# Patient Record
Sex: MALE | Race: WHITE | Employment: FULL TIME | URBAN - METROPOLITAN AREA
[De-identification: names, ages, dates, MRNs, and addresses within clinical notes are randomized per-mention and may not be internally consistent; named-entity substitution may affect disease eponyms.]

---
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
## 2024-03-26 ENCOUNTER — APPOINTMENT (OUTPATIENT)
Dept: GENERAL RADIOLOGY | Age: 24
End: 2024-03-26

## 2024-03-26 ENCOUNTER — HOSPITAL ENCOUNTER (EMERGENCY)
Age: 24
Discharge: HOME | End: 2024-03-26
Attending: EMERGENCY MEDICINE

## 2024-03-26 VITALS
HEIGHT: 73 IN | TEMPERATURE: 97.3 F | BODY MASS INDEX: 34.51 KG/M2 | WEIGHT: 260.36 LBS | RESPIRATION RATE: 20 BRPM | SYSTOLIC BLOOD PRESSURE: 147 MMHG | DIASTOLIC BLOOD PRESSURE: 87 MMHG | HEART RATE: 88 BPM | OXYGEN SATURATION: 100 %

## 2024-03-26 DIAGNOSIS — F41.1 ANXIETY STATE: ICD-10-CM

## 2024-03-26 DIAGNOSIS — R07.9 CHEST PAIN, UNSPECIFIED TYPE: Primary | ICD-10-CM

## 2024-03-26 LAB
ALBUMIN SERPL-MCNC: 4.8 G/DL (ref 3.5–5.2)
ALBUMIN/GLOB SERPL: 2 {RATIO} (ref 1–2.5)
ALP SERPL-CCNC: 63 U/L (ref 40–129)
ALT SERPL-CCNC: 24 U/L (ref 10–50)
ANION GAP SERPL CALCULATED.3IONS-SCNC: 15 MMOL/L (ref 9–16)
AST SERPL-CCNC: 32 U/L (ref 10–50)
BACTERIA URNS QL MICRO: ABNORMAL
BASOPHILS # BLD: 0.07 K/UL (ref 0–0.2)
BASOPHILS NFR BLD: 1 % (ref 0–2)
BILIRUB SERPL-MCNC: 1.4 MG/DL (ref 0–1.2)
BILIRUB UR QL STRIP: ABNORMAL
BUN SERPL-MCNC: 8 MG/DL (ref 6–20)
CALCIUM SERPL-MCNC: 9.7 MG/DL (ref 8.6–10.4)
CASTS #/AREA URNS LPF: ABNORMAL /LPF (ref 0–8)
CHLORIDE SERPL-SCNC: 102 MMOL/L (ref 98–107)
CLARITY UR: ABNORMAL
CO2 SERPL-SCNC: 21 MMOL/L (ref 20–31)
COLOR UR: ABNORMAL
CREAT SERPL-MCNC: 0.8 MG/DL (ref 0.7–1.2)
EOSINOPHIL # BLD: 0.04 K/UL (ref 0–0.44)
EOSINOPHILS RELATIVE PERCENT: 0 % (ref 1–4)
EPI CELLS #/AREA URNS HPF: ABNORMAL /HPF (ref 0–5)
ERYTHROCYTE [DISTWIDTH] IN BLOOD BY AUTOMATED COUNT: 12.9 % (ref 11.8–14.4)
GFR SERPL CREATININE-BSD FRML MDRD: >90 ML/MIN/1.73M2
GLUCOSE SERPL-MCNC: 100 MG/DL (ref 74–99)
GLUCOSE UR STRIP-MCNC: NEGATIVE MG/DL
HCT VFR BLD AUTO: 47.4 % (ref 40.7–50.3)
HGB BLD-MCNC: 16.5 G/DL (ref 13–17)
HGB UR QL STRIP.AUTO: NEGATIVE
IMM GRANULOCYTES # BLD AUTO: 0.03 K/UL (ref 0–0.3)
IMM GRANULOCYTES NFR BLD: 0 %
KETONES UR STRIP-MCNC: ABNORMAL MG/DL
LEUKOCYTE ESTERASE UR QL STRIP: NEGATIVE
LIPASE SERPL-CCNC: 37 U/L (ref 13–60)
LYMPHOCYTES NFR BLD: 1.73 K/UL (ref 1.1–3.7)
LYMPHOCYTES RELATIVE PERCENT: 13 % (ref 24–43)
MCH RBC QN AUTO: 28.5 PG (ref 25.2–33.5)
MCHC RBC AUTO-ENTMCNC: 34.8 G/DL (ref 28.4–34.8)
MCV RBC AUTO: 82 FL (ref 82.6–102.9)
MONOCYTES NFR BLD: 0.65 K/UL (ref 0.1–1.2)
MONOCYTES NFR BLD: 5 % (ref 3–12)
NEUTROPHILS NFR BLD: 81 % (ref 36–65)
NEUTS SEG NFR BLD: 10.98 K/UL (ref 1.5–8.1)
NITRITE UR QL STRIP: NEGATIVE
NRBC BLD-RTO: 0 PER 100 WBC
PH UR STRIP: 6 [PH] (ref 5–8)
PLATELET # BLD AUTO: 303 K/UL (ref 138–453)
PMV BLD AUTO: 9.3 FL (ref 8.1–13.5)
POTASSIUM SERPL-SCNC: 3.8 MMOL/L (ref 3.7–5.3)
PROT SERPL-MCNC: 7.8 G/DL (ref 6.6–8.7)
PROT UR STRIP-MCNC: ABNORMAL MG/DL
RBC # BLD AUTO: 5.78 M/UL (ref 4.21–5.77)
RBC # BLD: ABNORMAL 10*6/UL
RBC #/AREA URNS HPF: ABNORMAL /HPF (ref 0–4)
SODIUM SERPL-SCNC: 138 MMOL/L (ref 136–145)
SP GR UR STRIP: 1.03 (ref 1–1.03)
TROPONIN I SERPL HS-MCNC: <6 NG/L (ref 0–22)
TROPONIN I SERPL HS-MCNC: <6 NG/L (ref 0–22)
UROBILINOGEN UR STRIP-ACNC: NORMAL EU/DL (ref 0–1)
WBC #/AREA URNS HPF: ABNORMAL /HPF (ref 0–5)
WBC OTHER # BLD: 13.5 K/UL (ref 3.5–11.3)

## 2024-03-26 PROCEDURE — 83690 ASSAY OF LIPASE: CPT

## 2024-03-26 PROCEDURE — 80053 COMPREHEN METABOLIC PANEL: CPT

## 2024-03-26 PROCEDURE — 6360000002 HC RX W HCPCS

## 2024-03-26 PROCEDURE — 93005 ELECTROCARDIOGRAM TRACING: CPT

## 2024-03-26 PROCEDURE — 96374 THER/PROPH/DIAG INJ IV PUSH: CPT

## 2024-03-26 PROCEDURE — 2580000003 HC RX 258

## 2024-03-26 PROCEDURE — 81001 URINALYSIS AUTO W/SCOPE: CPT

## 2024-03-26 PROCEDURE — 71046 X-RAY EXAM CHEST 2 VIEWS: CPT

## 2024-03-26 PROCEDURE — 85025 COMPLETE CBC W/AUTO DIFF WBC: CPT

## 2024-03-26 PROCEDURE — 99285 EMERGENCY DEPT VISIT HI MDM: CPT

## 2024-03-26 PROCEDURE — 96361 HYDRATE IV INFUSION ADD-ON: CPT

## 2024-03-26 PROCEDURE — 84484 ASSAY OF TROPONIN QUANT: CPT

## 2024-03-26 RX ORDER — LORAZEPAM 2 MG/ML
0.5 INJECTION INTRAMUSCULAR ONCE
Status: COMPLETED | OUTPATIENT
Start: 2024-03-26 | End: 2024-03-26

## 2024-03-26 RX ORDER — 0.9 % SODIUM CHLORIDE 0.9 %
1000 INTRAVENOUS SOLUTION INTRAVENOUS ONCE
Status: COMPLETED | OUTPATIENT
Start: 2024-03-26 | End: 2024-03-26

## 2024-03-26 RX ORDER — MORPHINE SULFATE 4 MG/ML
2 INJECTION, SOLUTION INTRAMUSCULAR; INTRAVENOUS ONCE
Status: DISCONTINUED | OUTPATIENT
Start: 2024-03-26 | End: 2024-03-26 | Stop reason: HOSPADM

## 2024-03-26 RX ADMIN — SODIUM CHLORIDE 1000 ML: 9 INJECTION, SOLUTION INTRAVENOUS at 16:26

## 2024-03-26 RX ADMIN — LORAZEPAM 0.5 MG: 2 INJECTION INTRAMUSCULAR; INTRAVENOUS at 17:24

## 2024-03-26 ASSESSMENT — ENCOUNTER SYMPTOMS
BACK PAIN: 0
SHORTNESS OF BREATH: 0
VOMITING: 0
NAUSEA: 0
BLOOD IN STOOL: 0
DIARRHEA: 1
COUGH: 0

## 2024-03-26 ASSESSMENT — HEART SCORE: ECG: NORMAL

## 2024-03-26 NOTE — ED PROVIDER NOTES
I performed a history and physical examination of the patient and discussed management with the resident. I reviewed the resident’s note and agree with the documented findings and plan of care. Any areas of disagreement are noted on the chart. I was personally present for the key portions of any procedures. I have documented in the chart those procedures where I was not present during the key portions. I have reviewed the emergency nurses triage note. I agree with the chief complaint, past medical history, past surgical history, allergies, medications, social and family history as documented unless otherwise noted below. Documentation of the HPI, Physical Exam and Medical Decision Making performed by medical students or scribes is based on my personal performance of the HPI, PE and MDM.   For Phys Assistant/ Nurse Practitioner cases/documentation I have personally evaluated this patient and have completed at least one if not all key elements of the E/M (history, physical exam, and MDM). I find the patient's history and physical exam are consistent with the NP/PA documentation. I agree with the care provided, treatment rendered, disposition and followup plan.   Additional findings are as noted.    Adrian Parnell MD  Attending Emergency  Physician        EKG Interpretation    Interpreted by me    Rhythm: normal sinus   Rate: 89  Axis: normal  Ectopy: none  Conduction: Nonspecific interventricular conduction delay.  ST Segments: no acute change  T Waves: no acute change  Q Waves: none    Clinical Impression: no acute changes and no prior tracings available for comparison.       This patient presented with c/o sharp anterior chest pain which he believes is related to 'popping' his sternum recently. Also reports experiencing some lightheadedness and upper extremity paresthesias. Pain nonradiating, not associated with shortness of breath. No fever, chills, nausea, vomiting, cough. No PE/VTE risk factors. Reports  concern re symptoms due to strong family history of premature CAD. Smokes marijuana and vapes nicotine. Denies cocaine use.   At time of my examination, patient reports symptoms nearly resolved without treatment.    Awake, alert, cooperative, responsive. Speech fluent, normal comprehension.  Lungs clear bilaterally.  No rales, rhonchi, wheezes, stridor, retractions.  Cardiac-S1S2, RRR, no murmur, rub, or gallop.  Abdomen soft, nondistended, nontender.  No organomegaly, mass, bruit.  Normal bowel sounds.  Chest wall nontender, nonswollen.   Chest radiograph unremarkable.   Labs reviewed, no clinically significant abnormalities.   Impression:Atypical chest pain  Plan: Discharge, Contact and arrange f/u with PMD of choice. Return if sx worsen, progress, recur.      Adrian Parnell MD  03/27/24 [id number]

## 2024-03-26 NOTE — ED NOTES
Writer met with patient at bedside regarding concerns related to insurance.  Patient states that he currently does not have insurance and is concerned about his medical bill.  Writer provided patient with information for the HELP program and encouraged him to contact them regarding his medical bill.  Information was provided for Department of Job and Family and how to apply for Medicaid.  Patient reported no further needs at this time.

## 2024-03-26 NOTE — DISCHARGE INSTRUCTIONS
You have been seen and evaluated in the emergency department for chest pain.  All of your laboratory work was normal.  Chest x-ray did not show any signs of pneumonia.  Your EKG was normal and not show any signs of heart attack.    Recommend follow-up with your primary care physician if your symptoms persist and for evaluation of anxiety treatment.

## 2024-03-26 NOTE — ED NOTES
Patient presents to the ED with c/o chest pain. Patient states the chest pain started around 9am today, states it is left sided, intermittent. Patient states he felt a \"popping\" sensation in his sternum last night before going to bed. Patient denies any chest pain at this time, patient also reports headache that he took some Motrin with mild relief. Patient is alert and oriented x4, answering questions appropriately. Patient is changed into a gown, placed on cardiac monitor, EKG done, IV established, will continue plan of care.

## 2024-03-26 NOTE — ED PROVIDER NOTES
[facility] ED  Emergency Department Encounter  Emergency Medicine Resident     Pt Name:Kenyon Bay  MRN: [id number]  Birthdate 2000  Date of evaluation: 3/26/24  PCP:  No primary care provider on file.  Note Started: 4:00 PM EDT      CHIEF COMPLAINT       Chief Complaint   Patient presents with    Chest Pain     Started today. Family history    Diarrhea       HISTORY OF PRESENT ILLNESS  (Location/Symptom, Timing/Onset, Context/Setting, Quality, Duration, Modifying Factors, Severity.)      Kenyon Bay is a 24 y.o. male who presents with dull, achy chest pain beginning this morning upon awakening.  Patient states he did feel a \"pop\" in his sternum last night before going to bed.  Pain is slightly relieved with stretches.  Denies any nausea, vomiting, diaphoresis, palpitations, shortness of breath.  Does state he had 1 bout of diarrhea earlier in the morning, negative blood in stool.  Patient is anxious regarding symptoms due to his significant family history of cardiac events at early age.  Denies any fever, chills, myalgias or night sweats.  No recent sick contacts    PAST MEDICAL / SURGICAL / SOCIAL / FAMILY HISTORY      has no past medical history on file.       has a past surgical history that includes Appendectomy and Adenoidectomy.      Social History     Socioeconomic History    Marital status: Single     Spouse name: Not on file    Number of children: Not on file    Years of education: Not on file    Highest education level: Not on file   Occupational History    Not on file   Tobacco Use    Smoking status: Every Day     Current packs/day: 0.50     Types: Cigarettes    Smokeless tobacco: Never   Substance and Sexual Activity    Alcohol use: No    Drug use: No    Sexual activity: Not on file   Other Topics Concern    Not on file   Social History Narrative    Not on file     Social Determinants of Health     Financial Resource Strain: Not on file   Food Insecurity: Not on file  for possible treatment of his a underlying anxiety. [BG]      ED Course User Index  [BG] Ga Osorio MD       PROCEDURES:  None    CONSULTS:  None        FINAL IMPRESSION      1. Chest pain, unspecified type    2. Anxiety state          DISPOSITION / PLAN     DISPOSITION Decision To Discharge 03/26/2024 05:54:55 PM      PATIENT REFERRED TO:  [facility] ED  [address] [address]  [phone]  Go to   If symptoms worsen      DISCHARGE MEDICATIONS:  New Prescriptions    No medications on file       Ga Osorio MD  Emergency Medicine Resident    (Please note that portions of thisnote were completed with a voice recognition program.  Efforts were made to edit the dictations but occasionally words are mis-transcribed.).

## 2024-03-28 LAB
EKG ATRIAL RATE: 89 BPM
EKG P AXIS: 28 DEGREES
EKG P-R INTERVAL: 130 MS
EKG Q-T INTERVAL: 356 MS
EKG QRS DURATION: 124 MS
EKG QTC CALCULATION (BAZETT): 433 MS
EKG R AXIS: 43 DEGREES
EKG T AXIS: 44 DEGREES
EKG VENTRICULAR RATE: 89 BPM

## 2024-03-31 ENCOUNTER — APPOINTMENT (OUTPATIENT)
Dept: GENERAL RADIOLOGY | Age: 24
End: 2024-03-31

## 2024-03-31 ENCOUNTER — HOSPITAL ENCOUNTER (EMERGENCY)
Age: 24
Discharge: HOME | End: 2024-03-31
Attending: EMERGENCY MEDICINE

## 2024-03-31 VITALS
SYSTOLIC BLOOD PRESSURE: 131 MMHG | DIASTOLIC BLOOD PRESSURE: 81 MMHG | TEMPERATURE: 98.8 F | OXYGEN SATURATION: 98 % | RESPIRATION RATE: 15 BRPM | HEART RATE: 64 BPM

## 2024-03-31 DIAGNOSIS — R10.9 ABDOMINAL PAIN, UNSPECIFIED ABDOMINAL LOCATION: Primary | ICD-10-CM

## 2024-03-31 LAB
ALBUMIN SERPL-MCNC: 4.9 G/DL (ref 3.5–5.2)
ALBUMIN/GLOB SERPL: 2 {RATIO} (ref 1–2.5)
ALP SERPL-CCNC: 63 U/L (ref 40–129)
ALT SERPL-CCNC: 24 U/L (ref 10–50)
ANION GAP SERPL CALCULATED.3IONS-SCNC: 12 MMOL/L (ref 9–16)
AST SERPL-CCNC: 27 U/L (ref 10–50)
BASOPHILS # BLD: 0.06 K/UL (ref 0–0.2)
BASOPHILS NFR BLD: 1 % (ref 0–2)
BILIRUB SERPL-MCNC: 1.7 MG/DL (ref 0–1.2)
BUN SERPL-MCNC: 9 MG/DL (ref 6–20)
CALCIUM SERPL-MCNC: 10.2 MG/DL (ref 8.6–10.4)
CHLORIDE SERPL-SCNC: 100 MMOL/L (ref 98–107)
CO2 SERPL-SCNC: 24 MMOL/L (ref 20–31)
CREAT SERPL-MCNC: 0.8 MG/DL (ref 0.7–1.2)
EOSINOPHIL # BLD: 0.17 K/UL (ref 0–0.44)
EOSINOPHILS RELATIVE PERCENT: 2 % (ref 1–4)
ERYTHROCYTE [DISTWIDTH] IN BLOOD BY AUTOMATED COUNT: 12.7 % (ref 11.8–14.4)
GFR SERPL CREATININE-BSD FRML MDRD: >90 ML/MIN/1.73M2
GLUCOSE SERPL-MCNC: 110 MG/DL (ref 74–99)
HCT VFR BLD AUTO: 43.8 % (ref 40.7–50.3)
HGB BLD-MCNC: 15.4 G/DL (ref 13–17)
IMM GRANULOCYTES # BLD AUTO: 0.03 K/UL (ref 0–0.3)
IMM GRANULOCYTES NFR BLD: 0 %
LIPASE SERPL-CCNC: 32 U/L (ref 13–60)
LYMPHOCYTES NFR BLD: 2.29 K/UL (ref 1.1–3.7)
LYMPHOCYTES RELATIVE PERCENT: 22 % (ref 24–43)
MCH RBC QN AUTO: 28.4 PG (ref 25.2–33.5)
MCHC RBC AUTO-ENTMCNC: 35.2 G/DL (ref 28.4–34.8)
MCV RBC AUTO: 80.8 FL (ref 82.6–102.9)
MONOCYTES NFR BLD: 0.8 K/UL (ref 0.1–1.2)
MONOCYTES NFR BLD: 8 % (ref 3–12)
NEUTROPHILS NFR BLD: 67 % (ref 36–65)
NEUTS SEG NFR BLD: 7.2 K/UL (ref 1.5–8.1)
NRBC BLD-RTO: 0 PER 100 WBC
PLATELET # BLD AUTO: 296 K/UL (ref 138–453)
PMV BLD AUTO: 9.6 FL (ref 8.1–13.5)
POTASSIUM SERPL-SCNC: 3.5 MMOL/L (ref 3.7–5.3)
PROT SERPL-MCNC: 7.3 G/DL (ref 6.6–8.7)
RBC # BLD AUTO: 5.42 M/UL (ref 4.21–5.77)
RBC # BLD: ABNORMAL 10*6/UL
SODIUM SERPL-SCNC: 136 MMOL/L (ref 136–145)
TROPONIN I SERPL HS-MCNC: <6 NG/L (ref 0–22)
TROPONIN I SERPL HS-MCNC: <6 NG/L (ref 0–22)
WBC OTHER # BLD: 10.6 K/UL (ref 3.5–11.3)

## 2024-03-31 PROCEDURE — 85025 COMPLETE CBC W/AUTO DIFF WBC: CPT

## 2024-03-31 PROCEDURE — 84484 ASSAY OF TROPONIN QUANT: CPT

## 2024-03-31 PROCEDURE — 80053 COMPREHEN METABOLIC PANEL: CPT

## 2024-03-31 PROCEDURE — 71046 X-RAY EXAM CHEST 2 VIEWS: CPT

## 2024-03-31 PROCEDURE — 99285 EMERGENCY DEPT VISIT HI MDM: CPT | Performed by: EMERGENCY MEDICINE

## 2024-03-31 PROCEDURE — 83690 ASSAY OF LIPASE: CPT

## 2024-03-31 PROCEDURE — 93005 ELECTROCARDIOGRAM TRACING: CPT

## 2024-03-31 PROCEDURE — 6370000000 HC RX 637 (ALT 250 FOR IP)

## 2024-03-31 RX ORDER — FAMOTIDINE 20 MG/1
20 TABLET, FILM COATED ORAL 2 TIMES DAILY
Qty: 60 TABLET | Refills: 0 | Status: SHIPPED | OUTPATIENT
Start: 2024-03-31

## 2024-03-31 RX ORDER — MAGNESIUM HYDROXIDE/ALUMINUM HYDROXICE/SIMETHICONE 120; 1200; 1200 MG/30ML; MG/30ML; MG/30ML
30 SUSPENSION ORAL ONCE
Status: COMPLETED | OUTPATIENT
Start: 2024-03-31 | End: 2024-03-31

## 2024-03-31 RX ADMIN — ALUMINUM HYDROXIDE, MAGNESIUM HYDROXIDE, AND SIMETHICONE 30 ML: 200; 200; 20 SUSPENSION ORAL at 03:15

## 2024-03-31 ASSESSMENT — PAIN SCALES - GENERAL: PAINLEVEL_OUTOF10: 6

## 2024-03-31 NOTE — ED PROVIDER NOTES
[facility]     Emergency Department     Faculty Attestation    I performed a history and physical examination of the patient and discussed management with the resident. I reviewed the resident’s note and agree with the documented findings including all diagnostic interpretations and plan of care. Any areas of disagreement are noted on the chart. I was personally present for the key portions of any procedures. I have documented in the chart those procedures where I was not present during the key portions. I have reviewed the emergency nurses triage note. I agree with the chief complaint, past medical history, past surgical history, allergies, medications, social and family history as documented unless otherwise noted below. Documentation of the HPI, Physical Exam and Medical Decision Making performed by kira is based on my personal performance of the HPI, PE and MDM. For Physician Assistant/ Nurse Practitioner cases/documentation I have personally evaluated this patient and have completed at least one if not all key elements of the E/M (history, physical exam, and MDM). Additional findings are as noted.    Primary Care Physician: No primary care provider on file.    Note Started: 8:19 AM EDT     VITAL SIGNS:   oral temperature is 98.8 °F (37.1 °C). His blood pressure is 131/81 and his pulse is 64. His respiration is 15 and oxygen saturation is 98%.      Medical Decision Making  Epigastric pain that radiates into the chest.  Has had similar episodes previously but not routinely.  No fevers no vomiting.  On exam no acute distress cardiac exam regular rate and rhythm no murmurs rubs gallops, pulmonary clear bilaterally abdomen is soft nontender except for some minimal tenderness in the epigastric region no rebound no guarding.  Impression epigastric abdominal pain, likely GERD.However will obtain cardiac workup, symptomatic treatment, reassess      Amount 
3.5(!)   Chloride 100   CO2 24   Anion Gap 12   Glucose, Random 110(!)   BUN,BUNPL 9   Creatinine 0.8   Est, Glom Filt Rate >90   CALCIUM, SERUM, 164361 10.2   Total Protein 7.3   Albumin 4.9   ALBUMIN/GLOBULIN RATIO 2.0   BILIRUBIN TOTAL 1.7(!)   Alk Phos 63   ALT 24   AST 27 [GI]   0404 Lipase: 32 [GI]   0418 XR CHEST (2 VW)  IMPRESSION:  There is no evidence of acute chest disease. [GI]   0445 Troponin, High Sensitivity: <6 [GI]      ED Course User Index  [GI] Sixto Gutierrez DO       PROCEDURES:      CONSULTS:  None    CRITICAL CARE:  There was significant risk of life threatening deterioration of patient's condition requiring my direct management. Critical care time  minutes, excluding any documented procedures.    FINAL IMPRESSION      1. Abdominal pain, unspecified abdominal location          DISPOSITION / PLAN     DISPOSITION Decision To Discharge 03/31/2024 05:54:19 AM      PATIENT REFERRED TO:  [address] [address] [phone]  Call in 2 days  As needed      DISCHARGE MEDICATIONS:  Discharge Medication List as of 3/31/2024  5:54 AM        START taking these medications    Details   famotidine (PEPCID) 20 MG tablet Take 1 tablet by mouth 2 times daily, Disp-60 tablet, R-0Print             Sixto Gutierrez DO  Emergency Medicine Resident    (Please note that portions of this note were completed with a voice recognition program.  Efforts were made to edit the dictations but occasionally words are mis-transcribed.)

## 2024-03-31 NOTE — DISCHARGE INSTR - COC
Continuity of Care Form    Patient Name: Kenyon Bay   :  2000  MRN:  [id number]    Admit date:  3/31/2024  Discharge date:  ***    Code Status Order: No Order   Advance Directives:     Admitting Physician:  No admitting provider for patient encounter.  PCP: No primary care provider on file.    Discharging Nurse: ***  Discharging Hospital Unit/Room#:   Discharging Unit Phone Number: ***    Emergency Contact:   Extended Emergency Contact Information  Primary Emergency Contact: Iram Martinez   [facility]  Home Phone: [phone]  Relation: Brother/Sister    Past Surgical History:  Past Surgical History:   Procedure Laterality Date    ADENOIDECTOMY      APPENDECTOMY         Immunization History:     There is no immunization history on file for this patient.    Active Problems:  There is no problem list on file for this patient.      Isolation/Infection:   Isolation            No Isolation          Patient Infection Status       None to display            Nurse Assessment:  Last Vital Signs: /81   Pulse 64   Temp 98.8 °F (37.1 °C) (Oral)   Resp 15   SpO2 98%     Last documented pain score (0-10 scale): Pain Level: 6  Last Weight:   Wt Readings from Last 1 Encounters:   24 118.1 kg (260 lb 5.8 oz)     Mental Status:  {IP PT MENTAL STATUS:}    IV Access:  { NÉO IV ACCESS:722574745}    Nursing Mobility/ADLs:  Walking   {CHP DME ADLs:563051389}  Transfer  {CHP DME ADLs:485391773}  Bathing  {CHP DME ADLs:770059849}  Dressing  {CHP DME ADLs:161343699}  Toileting  {CHP DME ADLs:950934500}  Feeding  {CHP DME ADLs:174772205}  Med Admin  {CHP DME ADLs:840161535}  Med Delivery   { NOÉ MED Delivery:312401928}    Wound Care Documentation and Therapy:        Elimination:  Continence:   Bowel: {YES / NO:}  Bladder: {YES / NO:}  Urinary Catheter: {Urinary Catheter:126981418}   Colostomy/Ileostomy/Ileal Conduit: {YES / NO:}       Date of Last BM: ***  No intake or output

## 2024-03-31 NOTE — ED NOTES
Pt reports his symptoms are now resolved. Pt updated on POC. No needs expressed at this time. Call light within reach.

## 2024-03-31 NOTE — ED NOTES
Pt to room 22 from triage with c/o epigastric abdominal pain x several hours after eating spaghetti. Pt reports he was just seen in the ED several days ago for same complaint and was diagnosed with anxiety. Pt placed on monitor, EKG performed at bedside, IV established and labs obtained. Call light within reach. No needs expressed at this time.

## 2024-03-31 NOTE — DISCHARGE INSTRUCTIONS
You are seen in the emergency department lab work was within normal limits.  Believe that these are markers like symptoms.  Please take the Pepcid as prescribed.  Please follow-up with your PCP  Avoid eating any spicy food, milk type products or drinks that have caffeine in it.  Take all medications as prescribed.  For pain use ibuprofen (Motrin) or acetaminophen (Tylenol), unless prescribed medications that have acetaminophen in it.  You can take over the counter acetaminophen tablets (1 - 2 tablets of the 500-mg strength every 6 hours) or ibuprofen tablets (2 tablets every 4 hours).    PLEASE RETURN TO THE EMERGENCY DEPARTMENT IMMEDIATELY for worsening symptoms, or if you develop any concerning symptoms such as: high fever not relieved by acetaminophen (Tylenol) and/or ibuprofen (Motrin), chills, shortness of breath, chest pain, persistent nausea and/or vomiting, numbness, weakness or tingling in the arms or legs or change in color of the extremities, changes in mental status, persistent headache, blurry vision.    Return within 8 - 12 hours if you have any of the following: worsening of pain in your abdomen, no food sounds good to you, you continue to vomit, pain goes to your back or testicles, have pain in the abdomen when going over a bump in the car or when you jump up and down, inability to urinate, unable to follow up with your physician, or other any other care or concern.

## 2024-04-02 LAB
EKG ATRIAL RATE: 68 BPM
EKG P AXIS: 28 DEGREES
EKG P-R INTERVAL: 146 MS
EKG Q-T INTERVAL: 378 MS
EKG QRS DURATION: 128 MS
EKG QTC CALCULATION (BAZETT): 401 MS
EKG R AXIS: 30 DEGREES
EKG T AXIS: 39 DEGREES
EKG VENTRICULAR RATE: 68 BPM

## 2024-04-06 SDOH — HEALTH STABILITY: PHYSICAL HEALTH: ON AVERAGE, HOW MANY DAYS PER WEEK DO YOU ENGAGE IN MODERATE TO STRENUOUS EXERCISE (LIKE A BRISK WALK)?: 7 DAYS

## 2024-04-06 SDOH — HEALTH STABILITY: PHYSICAL HEALTH: ON AVERAGE, HOW MANY MINUTES DO YOU ENGAGE IN EXERCISE AT THIS LEVEL?: 150+ MIN

## 2024-04-08 ENCOUNTER — OFFICE VISIT (OUTPATIENT)
Dept: PRIMARY CARE CLINIC | Age: 24
End: 2024-04-08
Payer: COMMERCIAL

## 2024-04-08 VITALS
DIASTOLIC BLOOD PRESSURE: 88 MMHG | HEART RATE: 90 BPM | SYSTOLIC BLOOD PRESSURE: 130 MMHG | WEIGHT: 247.2 LBS | HEIGHT: 73 IN | BODY MASS INDEX: 32.76 KG/M2 | OXYGEN SATURATION: 90 %

## 2024-04-08 DIAGNOSIS — Z59.41 FOOD INSECURITY: ICD-10-CM

## 2024-04-08 DIAGNOSIS — Z72.0 TOBACCO ABUSE: ICD-10-CM

## 2024-04-08 DIAGNOSIS — Z72.0 TOBACCO ABUSE: Primary | ICD-10-CM

## 2024-04-08 DIAGNOSIS — Z87.891 PERSONAL HISTORY OF TOBACCO USE, PRESENTING HAZARDS TO HEALTH: ICD-10-CM

## 2024-04-08 DIAGNOSIS — M54.9 UPPER BACK PAIN ON RIGHT SIDE: ICD-10-CM

## 2024-04-08 DIAGNOSIS — R10.31 RIGHT GROIN PAIN: ICD-10-CM

## 2024-04-08 DIAGNOSIS — Z23 NEED FOR VACCINATION: ICD-10-CM

## 2024-04-08 DIAGNOSIS — F41.9 ANXIETY: ICD-10-CM

## 2024-04-08 DIAGNOSIS — Z82.49 FAMILY HISTORY OF HEART DISEASE: ICD-10-CM

## 2024-04-08 DIAGNOSIS — M25.551 HIP PAIN, RIGHT: ICD-10-CM

## 2024-04-08 DIAGNOSIS — Z76.89 ENCOUNTER TO ESTABLISH CARE: ICD-10-CM

## 2024-04-08 LAB
ALBUMIN SERPL-MCNC: 4.9 G/DL (ref 3.5–5.2)
ALBUMIN/GLOBULIN RATIO: 2 (ref 1–2.5)
ALP BLD-CCNC: 61 U/L (ref 40–129)
ALT SERPL-CCNC: 24 U/L (ref 10–50)
ANION GAP SERPL CALCULATED.3IONS-SCNC: 14 MMOL/L (ref 9–16)
AST SERPL-CCNC: 23 U/L (ref 10–50)
BASOPHILS ABSOLUTE: 0.06 K/UL (ref 0–0.2)
BASOPHILS RELATIVE PERCENT: 1 % (ref 0–2)
BILIRUB SERPL-MCNC: 1.3 MG/DL (ref 0–1.2)
BUN BLDV-MCNC: 8 MG/DL (ref 6–20)
CALCIUM SERPL-MCNC: 9.7 MG/DL (ref 8.6–10.4)
CHLORIDE BLD-SCNC: 104 MMOL/L (ref 98–107)
CHOLESTEROL, FASTING: 102 MG/DL (ref 0–199)
CHOLESTEROL/HDL RATIO: 2
CO2: 23 MMOL/L (ref 20–31)
CREAT SERPL-MCNC: 0.8 MG/DL (ref 0.7–1.2)
EOSINOPHILS ABSOLUTE: 0.17 K/UL (ref 0–0.44)
EOSINOPHILS RELATIVE PERCENT: 2 % (ref 1–4)
GFR SERPL CREATININE-BSD FRML MDRD: >90 ML/MIN/1.73M2
GLUCOSE BLD-MCNC: 85 MG/DL (ref 74–99)
HBV SURFACE AB TITR SER: 10.7 MIU/ML
HCT VFR BLD CALC: 47 % (ref 40.7–50.3)
HDLC SERPL-MCNC: 41 MG/DL
HEMOGLOBIN: 15.9 G/DL (ref 13–17)
HEPATITIS C ANTIBODY: NONREACTIVE
HIV AG/AB: NONREACTIVE
IMMATURE GRANULOCYTES %: 0 %
IMMATURE GRANULOCYTES ABSOLUTE: 0.03 K/UL (ref 0–0.3)
IRON % SATURATION: 28 % (ref 20–55)
IRON: 86 UG/DL (ref 61–157)
LDL CHOLESTEROL: 49 MG/DL (ref 0–100)
LYMPHOCYTES ABSOLUTE: 1.76 K/UL (ref 1.1–3.7)
LYMPHOCYTES RELATIVE PERCENT: 20 % (ref 24–43)
MCH RBC QN AUTO: 28.3 PG (ref 25.2–33.5)
MCHC RBC AUTO-ENTMCNC: 33.8 G/DL (ref 28.4–34.8)
MCV RBC AUTO: 83.8 FL (ref 82.6–102.9)
MONOCYTES ABSOLUTE: 0.7 K/UL (ref 0.1–1.2)
MONOCYTES RELATIVE PERCENT: 8 % (ref 3–12)
NEUTROPHILS ABSOLUTE: 6.24 K/UL (ref 1.5–8.1)
NEUTROPHILS RELATIVE PERCENT: 69 % (ref 36–65)
NRBC AUTOMATED: 0 PER 100 WBC
PDW BLD-RTO: 12.8 % (ref 11.8–14.4)
PLATELET # BLD: 200 K/UL (ref 138–453)
PMV BLD AUTO: 10.9 FL (ref 8.1–13.5)
POTASSIUM SERPL-SCNC: 3.9 MMOL/L (ref 3.7–5.3)
RBC # BLD: 5.61 M/UL (ref 4.21–5.77)
SODIUM BLD-SCNC: 141 MMOL/L (ref 136–145)
TOTAL IRON BINDING CAPACITY: 304 UG/DL (ref 250–450)
TOTAL PROTEIN: 7.6 G/DL (ref 6.6–8.7)
TRIGLYCERIDE, FASTING: 59 MG/DL (ref 0–149)
TSH SERPL DL<=0.05 MIU/L-ACNC: 1.54 UIU/ML (ref 0.27–4.2)
UNSATURATED IRON BINDING CAPACITY: 218 UG/DL (ref 112–347)
VITAMIN B-12: 452 PG/ML (ref 232–1245)
VITAMIN D 25-HYDROXY: 17 NG/ML (ref 30–100)
VLDLC SERPL CALC-MCNC: 12 MG/DL
WBC # BLD: 9 K/UL (ref 3.5–11.3)

## 2024-04-08 PROCEDURE — 90472 IMMUNIZATION ADMIN EACH ADD: CPT | Performed by: PHYSICIAN ASSISTANT

## 2024-04-08 PROCEDURE — 90715 TDAP VACCINE 7 YRS/> IM: CPT | Performed by: PHYSICIAN ASSISTANT

## 2024-04-08 PROCEDURE — 99406 BEHAV CHNG SMOKING 3-10 MIN: CPT | Performed by: PHYSICIAN ASSISTANT

## 2024-04-08 PROCEDURE — 99204 OFFICE O/P NEW MOD 45 MIN: CPT | Performed by: PHYSICIAN ASSISTANT

## 2024-04-08 PROCEDURE — 90677 PCV20 VACCINE IM: CPT | Performed by: PHYSICIAN ASSISTANT

## 2024-04-08 PROCEDURE — 90471 IMMUNIZATION ADMIN: CPT | Performed by: PHYSICIAN ASSISTANT

## 2024-04-08 SDOH — ECONOMIC STABILITY: FOOD INSECURITY: WITHIN THE PAST 12 MONTHS, YOU WORRIED THAT YOUR FOOD WOULD RUN OUT BEFORE YOU GOT MONEY TO BUY MORE.: SOMETIMES TRUE

## 2024-04-08 SDOH — ECONOMIC STABILITY: FOOD INSECURITY: WITHIN THE PAST 12 MONTHS, THE FOOD YOU BOUGHT JUST DIDN'T LAST AND YOU DIDN'T HAVE MONEY TO GET MORE.: SOMETIMES TRUE

## 2024-04-08 SDOH — ECONOMIC STABILITY - FOOD INSECURITY: FOOD INSECURITY: Z59.41

## 2024-04-08 SDOH — ECONOMIC STABILITY: HOUSING INSECURITY
IN THE LAST 12 MONTHS, WAS THERE A TIME WHEN YOU DID NOT HAVE A STEADY PLACE TO SLEEP OR SLEPT IN A SHELTER (INCLUDING NOW)?: YES

## 2024-04-08 SDOH — ECONOMIC STABILITY: INCOME INSECURITY: HOW HARD IS IT FOR YOU TO PAY FOR THE VERY BASICS LIKE FOOD, HOUSING, MEDICAL CARE, AND HEATING?: SOMEWHAT HARD

## 2024-04-08 ASSESSMENT — PATIENT HEALTH QUESTIONNAIRE - PHQ9
SUM OF ALL RESPONSES TO PHQ QUESTIONS 1-9: 0
SUM OF ALL RESPONSES TO PHQ QUESTIONS 1-9: 0
2. FEELING DOWN, DEPRESSED OR HOPELESS: NOT AT ALL
SUM OF ALL RESPONSES TO PHQ QUESTIONS 1-9: 0
SUM OF ALL RESPONSES TO PHQ QUESTIONS 1-9: 0
1. LITTLE INTEREST OR PLEASURE IN DOING THINGS: NOT AT ALL
SUM OF ALL RESPONSES TO PHQ9 QUESTIONS 1 & 2: 0

## 2024-04-08 ASSESSMENT — ENCOUNTER SYMPTOMS
DIARRHEA: 0
SHORTNESS OF BREATH: 0
COUGH: 0
ABDOMINAL DISTENTION: 0
SORE THROAT: 0
BACK PAIN: 1
CHEST TIGHTNESS: 0
CONSTIPATION: 0
ABDOMINAL PAIN: 0

## 2024-04-08 NOTE — PATIENT INSTRUCTIONS
[facility] Financial Resources*  (Call 211 if need more resources).     [facility] Veterans Service Commission:  What they offer:  Electric and gas payment services for veterans  Phone Number: ([phone] 204-1512 Service-Intake    [facility] Action Partnership (GLCAP):   [place], [place], [place], [place], [place]  counites  What they offer: Assistance with utility and housing expenses.  Phone Number: [phone]     [facility] Community Action Commission (NOCAC):   [place], [place], [place], and [place] counites  What they offer: Services for homelessness, housing, and home weatherization and repair.  Phone Number: [phone]    [employer]:   [facility]  What they offer: Heating Fuel Payment Assistance, Electric Service Payment Assistance and Water Service Payment Assistance  Phone Number: (732) 302-5583 ext: x11 Service-Intake    [employer]:  What they offer: Heat, Electric, Gas and water payment services.  Phone Number: ([phone] 277-9890 Service-Intake    Community Action Commission of [place] Counites:  What they offer: Electric, gas and water payment service.  Phone: [phone]        [place] Department of Job and Family Services (ODJSF):  What they offer: Government programs including Medicaid, SNAP(food stamps), TANF (cash assistance), and childcare assistance.  Phone Number: [phone]    Area Office on Aging of [facility] ([facility]):  What they offer: Medical cab rides for seniors and referral to community resources  Phone Number: [phone]     Area Agency on Aging, District 5:    [place], [place], [place], [place], [place], [place], [place], [place],  [facility]:  What they offer: Referral to transportation and other resources for seniors.  Phone Number: [phone]     Medications/Medical    [facility] Financial Assistance  What they offer: Assistance with [facility] bills  Phone: [phone];

## 2024-04-08 NOTE — PROGRESS NOTES
YES Answers must have Comments  1. \"Have you been to the ER, urgent care clinic since your last visit?  Hospitalized since your last visit?\"    [] YES {Yes/No:60706::\"Yes; Where: ***, When: ***\"}  [] NO       2. “Have you seen or consulted any other health care providers outside of [facility] since your last visit?”    [] YES {Yes/No:44394::\"Yes; Where: ***, When: ***\"}  [] NO    For questions 3-5, Nurse/CMA to request records if not in chart    3.  For patients aged 45-75: “Have you had a colorectal cancer screening such as a colonoscopy/FIT/Cologuard?    [] YES {Yes/No:60323::\"Yes; Where: ***, When: ***\"}  [] NO   [] NA, based on age    If the patient is female:      4. For female patients aged 40-74: “Have you had a mammogram?”   [] YES No  [] NO   [x] NA, based on age    5.  For female patients aged 21-65: “Have you had a pap smear?”    [] YES No  [] NO  [x] NA, based on age   
   Lipid, Fasting; Future  -     Varicella Zoster Antibody, IgG; Future  4. Food insecurity  -     [facility] Referral for Social Determinants of Health (Primary Care Practices)  -     Hepatitis B Surface Antibody; Future  -     Hepatitis C Antibody; Future  -     HIV Screen; Future  -     TSH With Reflex Ft4; Future  -     CBC with Auto Differential; Future  -     Comprehensive Metabolic Panel; Future  -     Lipid, Fasting; Future  -     Varicella Zoster Antibody, IgG; Future  5. Encounter to establish care  -     [facility] Referral for Social Determinants of Health ([facility] Care Practices)  -     Hepatitis B Surface Antibody; Future  -     Hepatitis C Antibody; Future  -     HIV Screen; Future  -     TSH With Reflex Ft4; Future  -     CBC with Auto Differential; Future  -     Comprehensive Metabolic Panel; Future  -     Lipid, Fasting; Future  -     Varicella Zoster Antibody, IgG; Future  6. BMI 32.0-32.9,adult  -     [facility] Referral for Social Determinants of Health ([facility] Care Practices)  -     Hepatitis B Surface Antibody; Future  -     Hepatitis C Antibody; Future  -     HIV Screen; Future  -     TSH With Reflex Ft4; Future  -     CBC with Auto Differential; Future  -     Comprehensive Metabolic Panel; Future  -     Lipid, Fasting; Future  -     Varicella Zoster Antibody, IgG; Future  -     Iron and TIBC; Future  -     Vitamin D 25 Hydroxy; Future  -     Vitamin B12; Future  7. Need for vaccination  -     Tdap, BOOSTRIX, (age 10 yrs+), IM  -     Pneumococcal, PCV20, PREVNAR 20, (age 6w+), IM, PF  8. Right groin pain  -     [facility] Physical Therapy - [facility]  -     XR THORACIC SPINE (3 VIEWS); Future  9. Hip pain, right  -     [facility]  -     XR THORACIC SPINE (3 VIEWS); Future  10. Upper back pain on right side  -     [facility] - [facility]  -     XR HIP 2-3 VW W PELVIS RIGHT; Future  11. Anxiety  -     [facility])         Return for Follow

## 2024-04-09 ENCOUNTER — TELEPHONE (OUTPATIENT)
Dept: PRIMARY CARE CLINIC | Age: 24
End: 2024-04-09

## 2024-04-09 NOTE — TELEPHONE ENCOUNTER
[facility] was contacted by a Community Health Navigator to discuss a referral for SDOH related needs.     Writer spoke with: Patient and explained the services and assistance that can be provided through the Community Health Navigation program.     Patient not agreeable to receiving resources and support from writer.     Intake Notes: Writer spoke with patient and he stated sometimes he doesn't eat but he does not need help with food and financials.    Action steps to be completed by writer: Close referral.    Action steps to be completed by patient: Close referral.    Patient has given verbal permission to leave detailed messages regarding SDOH referral on their phone: N/A    Patient has given verbal permission to submit applications on their behalf: N/A    Patient voiced understanding of action plan and responsibilities, was provided with writer's contact information, and agrees to call should they require additional assistance: N/A      Jammie Beltrán MA

## 2024-04-10 LAB — VZV IGG SER QL IA: 2.26

## 2024-04-11 ENCOUNTER — HOSPITAL ENCOUNTER (OUTPATIENT)
Dept: GENERAL RADIOLOGY | Age: 24
Discharge: HOME | End: 2024-04-13
Payer: COMMERCIAL

## 2024-04-11 ENCOUNTER — HOSPITAL ENCOUNTER (OUTPATIENT)
Age: 24
Discharge: HOME | End: 2024-04-13
Payer: COMMERCIAL

## 2024-04-11 DIAGNOSIS — M25.551 HIP PAIN, RIGHT: ICD-10-CM

## 2024-04-11 DIAGNOSIS — M54.9 UPPER BACK PAIN ON RIGHT SIDE: ICD-10-CM

## 2024-04-11 DIAGNOSIS — R10.31 RIGHT GROIN PAIN: ICD-10-CM

## 2024-04-11 PROCEDURE — 72072 X-RAY EXAM THORAC SPINE 3VWS: CPT

## 2024-04-11 PROCEDURE — 73502 X-RAY EXAM HIP UNI 2-3 VIEWS: CPT

## 2024-05-29 ENCOUNTER — OFFICE VISIT (OUTPATIENT)
Dept: PRIMARY CARE CLINIC | Age: 24
End: 2024-05-29

## 2024-05-29 VITALS
DIASTOLIC BLOOD PRESSURE: 80 MMHG | HEIGHT: 73 IN | SYSTOLIC BLOOD PRESSURE: 120 MMHG | WEIGHT: 259.2 LBS | HEART RATE: 92 BPM | OXYGEN SATURATION: 99 % | BODY MASS INDEX: 34.35 KG/M2

## 2024-05-29 DIAGNOSIS — R51.9 ACUTE INTRACTABLE HEADACHE, UNSPECIFIED HEADACHE TYPE: ICD-10-CM

## 2024-05-29 DIAGNOSIS — H61.23 BILATERAL IMPACTED CERUMEN: ICD-10-CM

## 2024-05-29 DIAGNOSIS — G43.811 OTHER MIGRAINE WITH STATUS MIGRAINOSUS, INTRACTABLE: ICD-10-CM

## 2024-05-29 DIAGNOSIS — R10.32 LLQ ABDOMINAL PAIN: Primary | ICD-10-CM

## 2024-05-29 RX ORDER — DICYCLOMINE HYDROCHLORIDE 10 MG/1
10 CAPSULE ORAL
Qty: 120 CAPSULE | Refills: 0 | Status: SHIPPED | OUTPATIENT
Start: 2024-05-29

## 2024-05-29 RX ORDER — SUMATRIPTAN 25 MG/1
25 TABLET, FILM COATED ORAL DAILY PRN
Qty: 9 TABLET | Refills: 0 | Status: SHIPPED | OUTPATIENT
Start: 2024-05-29

## 2024-05-29 ASSESSMENT — ENCOUNTER SYMPTOMS
ABDOMINAL PAIN: 0
DIARRHEA: 0
ABDOMINAL DISTENTION: 0
PHOTOPHOBIA: 0
SHORTNESS OF BREATH: 0
CHEST TIGHTNESS: 0
CONSTIPATION: 0
COUGH: 0

## 2024-05-29 NOTE — PROGRESS NOTES
Other migraine with status migrainosus, intractable  -     SUMAtriptan (IMITREX) 25 MG tablet; Take 1 tablet by mouth daily as needed for Migraine, Disp-9 tablet, R-0Normal     Bilateral ear wash performed.         Return for MA visit for ear wash in 2 weeks. .     Patient given educational materials - see patient instructions.  Discussed use, benefit, and side effects of prescribed medications.  All patient questions answered. Pt voiced understanding. Reviewed health maintenance.  Instructed to continue current medications, diet and exercise.  Patient agreed with treatment plan. Follow up as directed.     Electronically signed by ROSANA Reynaga on 5/29/2024 at 11:10 AM

## 2024-06-01 ENCOUNTER — HOSPITAL ENCOUNTER (OUTPATIENT)
Dept: GENERAL RADIOLOGY | Age: 24
End: 2024-06-01
Payer: COMMERCIAL

## 2024-06-01 ENCOUNTER — HOSPITAL ENCOUNTER (OUTPATIENT)
Age: 24
End: 2024-06-01
Payer: COMMERCIAL

## 2024-06-01 DIAGNOSIS — R10.32 LLQ ABDOMINAL PAIN: ICD-10-CM

## 2024-06-01 PROCEDURE — 74018 RADEX ABDOMEN 1 VIEW: CPT

## 2024-06-12 ENCOUNTER — NURSE ONLY (OUTPATIENT)
Dept: PRIMARY CARE CLINIC | Age: 24
End: 2024-06-12

## 2024-06-12 VITALS
WEIGHT: 262 LBS | BODY MASS INDEX: 34.57 KG/M2 | OXYGEN SATURATION: 98 % | HEART RATE: 94 BPM | SYSTOLIC BLOOD PRESSURE: 120 MMHG | DIASTOLIC BLOOD PRESSURE: 88 MMHG

## 2024-06-12 DIAGNOSIS — H61.23 BILATERAL IMPACTED CERUMEN: Primary | ICD-10-CM

## 2024-06-12 NOTE — PROGRESS NOTES
Patient is here today for bilateral ear lavage for impacted cerumen.    Patient states he has been using debrox ear drops once daily for about 2 weeks as directed by Pete Nugent PA-C. I was able to remove a large amount of impacted cerumen from both ears with the lavage.    Patient advised to allow warm water from the shower to access his ears to help prevent the buildup of earwax, patient also advised not to used q-tips in his ears. Patient advised to use debrox ear drops for about 3-5 days if patient feels his ears may be getting plugged again. Patient verbalized understanding. Patient handled procedure well.